# Patient Record
Sex: MALE | Race: WHITE | URBAN - METROPOLITAN AREA
[De-identification: names, ages, dates, MRNs, and addresses within clinical notes are randomized per-mention and may not be internally consistent; named-entity substitution may affect disease eponyms.]

---

## 2018-01-23 ENCOUNTER — OFFICE VISIT (OUTPATIENT)
Dept: PODIATRY | Facility: OTHER | Age: 48
End: 2018-01-23
Payer: COMMERCIAL

## 2018-01-23 VITALS — BODY MASS INDEX: 32.34 KG/M2 | WEIGHT: 244 LBS | TEMPERATURE: 97.4 F | HEIGHT: 73 IN

## 2018-01-23 DIAGNOSIS — L60.3 ONYCHODYSTROPHY: Primary | ICD-10-CM

## 2018-01-23 DIAGNOSIS — L30.1 DYSHIDROSIS: ICD-10-CM

## 2018-01-23 PROCEDURE — 99203 OFFICE O/P NEW LOW 30 MIN: CPT | Performed by: PODIATRIST

## 2018-01-23 RX ORDER — CLONIDINE HYDROCHLORIDE 0.2 MG/1
TABLET ORAL
Refills: 0 | COMMUNITY
Start: 2017-06-20

## 2018-01-23 RX ORDER — MIRTAZAPINE 45 MG/1
TABLET, FILM COATED ORAL
Refills: 0 | COMMUNITY
Start: 2017-06-20

## 2018-01-23 RX ORDER — SIMVASTATIN 40 MG
TABLET ORAL
Refills: 0 | COMMUNITY
Start: 2017-06-20

## 2018-01-23 RX ORDER — GABAPENTIN 300 MG/1
CAPSULE ORAL
Refills: 3 | COMMUNITY
Start: 2017-07-11

## 2018-01-23 RX ORDER — LISINOPRIL 10 MG/1
TABLET ORAL
Refills: 0 | COMMUNITY
Start: 2017-06-20

## 2018-01-23 RX ORDER — GLYBURIDE-METFORMIN HYDROCHLORIDE 5; 500 MG/1; MG/1
1 TABLET ORAL 2 TIMES DAILY WITH MEALS
COMMUNITY

## 2018-01-23 RX ORDER — CLONIDINE HYDROCHLORIDE 0.1 MG/1
TABLET ORAL
Refills: 3 | COMMUNITY
Start: 2017-07-07

## 2018-01-23 ASSESSMENT — PAIN SCALES - GENERAL: PAINLEVEL: SEVERE PAIN (7)

## 2018-01-23 NOTE — MR AVS SNAPSHOT
"              After Visit Summary   2018    Zafar Castaneda    MRN: [id number]           Patient Information     Date Of Birth          1970        Visit Information        Provider Department      2018 3:15 PM Nick Livingston DPM [facility]        Today's Diagnoses     Onychodystrophy    -  1    Dyshidrosis          Care Instructions    Follow up as needed            Follow-ups after your visit        Who to contact     If you have questions or need follow up information about today's clinic visit or your schedule please contact [facility] directly at [phone].  Normal or non-critical lab and imaging results will be communicated to you by [employer], letter or phone within 4 business days after the clinic has received the results. If you do not hear from us within 7 days, please contact the clinic through [employer] or phone. If you have a critical or abnormal lab result, we will notify you by phone as soon as possible.  Submit refill requests through [employer] or call your pharmacy and they will forward the refill request to us. Please allow 3 business days for your refill to be completed.          Additional Information About Your Visit        MyChart Information     [employer] lets you send messages to your doctor, view your test results, renew your prescriptions, schedule appointments and more. To sign up, go to [email or web address] . Click on \"Log in\" on the left side of the screen, which will take you to the Welcome page. Then click on \"Sign up Now\" on the right side of the page.     You will be asked to enter the access code listed below, as well as some personal information. Please follow the directions to create your username and password.     Your access code is: [email or web address]  Expires: 2018  4:03 PM     Your access code will  in 90 days. If you need help or a new code, please call your [facility] or [phone].        Care EveryWhere ID     " "This is your Care EveryWhere ID. This could be used by other organizations to access your [place] medical records  [id number]        Your Vitals Were     Temperature Height BMI (Body Mass Index)             97.4  F (36.3  C) (Temporal) 6' 1\" (1.854 m) 32.19 kg/m2          Blood Pressure from Last 3 Encounters:   No data found for BP    Weight from Last 3 Encounters:   01/23/18 244 lb (110.7 kg)              Today, you had the following     No orders found for display       Primary Care Provider Office Phone # Fax #    [facility] [phone]       [address] [facility] [address]        Equal Access to Services     MARY JO WANG : Brenden shah Sofiona, waaxda luqadaha, qaybta kaalmada adeegyada, emre lui . So [facility] [phone].    ATENCIÓN: Si habla español, tiene a guzman disposición servicios gratuitos de asistencia lingüística. Llame al [phone].    We comply with applicable federal civil rights laws and [place] laws. We do not discriminate on the basis of race, color, national origin, age, disability, sex, sexual orientation, or gender identity.            Thank you!     Thank you for choosing [facility]  for your care. Our goal is always to provide you with excellent care. Hearing back from our patients is one way we can continue to improve our services. Please take a few minutes to complete the written survey that you may receive in the mail after your visit with us. Thank you!             Your Updated Medication List - Protect others around you: Learn how to safely use, store and throw away your medicines at www.disposemymeds.org.          This list is accurate as of: 1/23/18  4:03 PM.  Always use your most recent med list.                   Brand Name Dispense Instructions for use Diagnosis    ASPIRIN 81 PO           * cloNIDine 0.2 MG tablet    CATAPRES          * cloNIDine 0.1 MG tablet    CATAPRES     TAKE ONE " (1) TABLET ORALLY (BY MOUTH) EACH MORNING AND EACH NIGHT AT BEDTIME        gabapentin 300 MG capsule    NEURONTIN     TAKE ONE (1) CAPSULE ORALLY (BY MOUTH) THREE TIMES DAILY        glyBURIDE-metFORMIN 5-500 MG per tablet    GLUCOVANCE     Take 1 tablet by mouth 2 times daily (with meals)        lisinopril 10 MG tablet    PRINIVIL/ZESTRIL          metFORMIN 500 MG tablet    GLUCOPHAGE          mirtazapine 45 MG tablet    REMERON          simvastatin 40 MG tablet    ZOCOR          * Notice:  This list has 2 medication(s) that are the same as other medications prescribed for you. Read the directions carefully, and ask your doctor or other care provider to review them with you.

## 2018-01-23 NOTE — NURSING NOTE
"Chief Complaint   Patient presents with     Consult     morning and night pain; new pt to [facility]     Diabetes     Type 2       Initial Temp 97.4  F (36.3  C) (Temporal)  Ht 6' 1\" (1.854 m)  Wt 244 lb (110.7 kg)  BMI 32.19 kg/m2 Estimated body mass index is 32.19 kg/(m^2) as calculated from the following:    Height as of this encounter: 6' 1\" (1.854 m).    Weight as of this encounter: 244 lb (110.7 kg).  BP completed using cuff size: NA (Not Taken)  Medication Reconciliation: complete    Mile Mcmahon CMA, January 23, 2018    "

## 2018-01-23 NOTE — PROGRESS NOTES
HPI:  Zafar Castaneda is a 47 year old male who is seen in consultation at the request of [facility].    Pt presents for eval of:   (Onset, Location, L/R, Character, Treatments, Injury if yes)     DM Type 2    Ongoing Left and Right dry feet, thick, discolored toenails  Intermittent morning and evening pain, burning, shooting, pain 7    [facility]  [address] [place] [address]  Phone: [phone]  Fax: [phone]    Weight management plan: Patient was referred to their PCP to discuss a diet and exercise plan.    ROS:  10 point ROS neg other than the symptoms noted above in the HPI.    PAST MEDICAL HISTORY: History reviewed. No pertinent past medical history.     PAST SURGICAL HISTORY: History reviewed. No pertinent surgical history.     MEDICATIONS:   Current Outpatient Prescriptions:      glyBURIDE-metFORMIN (GLUCOVANCE) 5-500 MG per tablet, Take 1 tablet by mouth 2 times daily (with meals), Disp: , Rfl:      ASPIRIN 81 PO, , Disp: , Rfl:      metFORMIN (GLUCOPHAGE) 500 MG tablet, , Disp: , Rfl: 0     mirtazapine (REMERON) 45 MG tablet, , Disp: , Rfl: 0     simvastatin (ZOCOR) 40 MG tablet, , Disp: , Rfl: 0     cloNIDine (CATAPRES) 0.2 MG tablet, , Disp: , Rfl: 0     cloNIDine (CATAPRES) 0.1 MG tablet, TAKE ONE (1) TABLET ORALLY (BY MOUTH) EACH MORNING AND EACH NIGHT AT BEDTIME, Disp: , Rfl: 3     lisinopril (PRINIVIL/ZESTRIL) 10 MG tablet, , Disp: , Rfl: 0     gabapentin (NEURONTIN) 300 MG capsule, TAKE ONE (1) CAPSULE ORALLY (BY MOUTH) THREE TIMES DAILY, Disp: , Rfl: 3     ALLERGIES:    Allergies   Allergen Reactions     Penicillins      Other reaction(s): *Unknown - Childhood Rxn        SOCIAL HISTORY:   Social History     Social History     Marital status:      Spouse name: N/A     Number of children: N/A     Years of education: N/A     Occupational History     Not on file.     Social History Main Topics     Smoking status: Former Smoker      "Smokeless tobacco: Never Used     Alcohol use Not on file     Drug use: Not on file     Sexual activity: Not on file     Other Topics Concern     Not on file     Social History Narrative     No narrative on file        FAMILY HISTORY: History reviewed. No pertinent family history.     EXAM:Vitals: Temp 97.4  F (36.3  C) (Temporal)  Ht 6' 1\" (1.854 m)  Wt 244 lb (110.7 kg)  BMI 32.19 kg/m2  BMI= Body mass index is 32.19 kg/(m^2).    General appearance: Patient is alert and fully cooperative with history & exam.  No sign of distress is noted during the visit.     Psychiatric: Affect is pleasant & appropriate.  Patient appears motivated to improve health.     Respiratory: Breathing is regular & unlabored while sitting.     HEENT: Hearing is intact to spoken word.  Speech is clear.  No gross evidence of visual impairment that would impact ambulation.     Vascular: DP & PT pulses are intact & regular bilaterally.  No significant edema or varicosities noted.  CFT and skin temperature is normal to both lower extremities.     Neurologic: Lower extremity sensation is intact to light touch.  He appears to have hypersensitivities to his lower extremities but protective threshold remains intact.  He likely does have peripheral neuropathy however he does not have loss of protective threshold indicating his risk currently for amputation associated with deformity or neuropathy is not significant.    Dermatologic: Skin is intact to both lower extremities with thickened elongated dry crumbling toenails forming pincer nails.  No acute abscess or drainage.  No avulsion is noted.  No subungual hematoma or bleeding.    Musculoskeletal: Patient is ambulatory without assistive device or brace.  No gross ankle deformity noted.  No foot or ankle joint effusion is noted.       ASSESSMENT:       ICD-10-CM    1. Onychodystrophy L60.3    2. Dyshidrosis L30.1         PLAN:  Reviewed patient's chart in [facility].      1/23/2018   I debrided the " toenails manually and mechanically ×10  Recommended that the patient debride his toenails per normal [facility] routine every 1 or 2 weeks rather than waiting until the nails are bulky or shaggy.  Bulky or shaggy crumbling toenails will be more painful than nails that are trimmed every week or 2.  Also recommend petroleum-based hydration ointment or cream daily to the feet and/or toenails.  Follow-up as needed  Discussed this with Fransisca  Will send note to EDELMIRA Gongora at [facility] phone #[phone], fax #[phone]      Nick Livingston DPM